# Patient Record
Sex: FEMALE | Race: WHITE | HISPANIC OR LATINO | Employment: FULL TIME | URBAN - NONMETROPOLITAN AREA
[De-identification: names, ages, dates, MRNs, and addresses within clinical notes are randomized per-mention and may not be internally consistent; named-entity substitution may affect disease eponyms.]

---

## 2023-09-10 ENCOUNTER — OFFICE VISIT (OUTPATIENT)
Dept: URGENT CARE | Facility: CLINIC | Age: 33
End: 2023-09-10
Payer: COMMERCIAL

## 2023-09-10 VITALS
HEART RATE: 56 BPM | TEMPERATURE: 98.6 F | DIASTOLIC BLOOD PRESSURE: 68 MMHG | HEIGHT: 68 IN | RESPIRATION RATE: 18 BRPM | BODY MASS INDEX: 27.74 KG/M2 | WEIGHT: 183 LBS | SYSTOLIC BLOOD PRESSURE: 106 MMHG | OXYGEN SATURATION: 100 %

## 2023-09-10 DIAGNOSIS — R10.13 EPIGASTRIC ABDOMINAL PAIN: ICD-10-CM

## 2023-09-10 LAB
APPEARANCE UR: CLEAR
BILIRUB UR STRIP-MCNC: NEGATIVE MG/DL
COLOR UR AUTO: YELLOW
GLUCOSE UR STRIP.AUTO-MCNC: NEGATIVE MG/DL
KETONES UR STRIP.AUTO-MCNC: NEGATIVE MG/DL
LEUKOCYTE ESTERASE UR QL STRIP.AUTO: NORMAL
NITRITE UR QL STRIP.AUTO: NEGATIVE
PH UR STRIP.AUTO: 5.5 [PH] (ref 5–8)
POCT INT CON NEG: NEGATIVE
POCT INT CON POS: POSITIVE
POCT URINE PREGNANCY TEST: NEGATIVE
PROT UR QL STRIP: NEGATIVE MG/DL
RBC UR QL AUTO: NEGATIVE
SP GR UR STRIP.AUTO: 1.02
UROBILINOGEN UR STRIP-MCNC: 0.2 MG/DL

## 2023-09-10 PROCEDURE — 81025 URINE PREGNANCY TEST: CPT

## 2023-09-10 PROCEDURE — 81002 URINALYSIS NONAUTO W/O SCOPE: CPT

## 2023-09-10 PROCEDURE — 3078F DIAST BP <80 MM HG: CPT

## 2023-09-10 PROCEDURE — 99203 OFFICE O/P NEW LOW 30 MIN: CPT

## 2023-09-10 PROCEDURE — 3074F SYST BP LT 130 MM HG: CPT

## 2023-09-10 RX ORDER — OMEPRAZOLE 20 MG/1
20 CAPSULE, DELAYED RELEASE ORAL DAILY
Qty: 30 CAPSULE | Refills: 0 | Status: SHIPPED | OUTPATIENT
Start: 2023-09-10 | End: 2023-10-10

## 2023-09-10 ASSESSMENT — ENCOUNTER SYMPTOMS
ABDOMINAL PAIN: 1
DIARRHEA: 0
MYALGIAS: 0
NAUSEA: 1
VOMITING: 1
BLOOD IN STOOL: 0
HEARTBURN: 1
CONSTIPATION: 0
SHORTNESS OF BREATH: 0

## 2023-09-10 NOTE — PROGRESS NOTES
"Subjective:     CHIEF COMPLAINT  Chief Complaint   Patient presents with    Abdominal Pain     Abdominal pain in middle of stomach x 2 days N/V        HPI  Batool Lopez is a very pleasant 33 y.o. female who presents with epigastric abdominal pain with nausea/vomiting for the past 2 days.  She has been feeling like she is having terrible acid reflux that has not been responding well to Tums.  She feels that the acid is causing her to vomit.  She has been able to tolerate food and liquids.  She denies seeing any blood in her vomit.  She has been having normal bowel movements without blood in her stool.    REVIEW OF SYSTEMS  Review of Systems   Respiratory:  Negative for shortness of breath.    Cardiovascular:  Negative for chest pain.   Gastrointestinal:  Positive for abdominal pain (Epigastric), heartburn, nausea and vomiting. Negative for blood in stool, constipation, diarrhea and melena.   Musculoskeletal:  Negative for myalgias.       PAST MEDICAL HISTORY  There are no problems to display for this patient.      SURGICAL HISTORY  patient denies any surgical history    ALLERGIES  No Known Allergies    CURRENT MEDICATIONS  Home Medications       Reviewed by HILDA Patiño-CHarry (Physician Assistant) on 09/10/23 at 1250  Med List Status: <None>     Medication Last Dose Status        Patient Heber Taking any Medications                           SOCIAL HISTORY  Social History     Tobacco Use    Smoking status: Never    Smokeless tobacco: Never   Substance and Sexual Activity    Alcohol use: Never    Drug use: Never    Sexual activity: Not on file       FAMILY HISTORY  History reviewed. No pertinent family history.       Objective:     VITAL SIGNS: /68   Pulse (!) 56   Temp 37 °C (98.6 °F) (Temporal)   Resp 18   Ht 1.727 m (5' 8\")   Wt 83 kg (183 lb)   SpO2 100%   BMI 27.83 kg/m²     PHYSICAL EXAM  Physical Exam  Vitals reviewed. Exam conducted with a chaperone present.   Constitutional:       General: " She is not in acute distress.     Appearance: Normal appearance. She is not ill-appearing or toxic-appearing.   HENT:      Head: Normocephalic and atraumatic.      Nose: Nose normal.      Mouth/Throat:      Mouth: Mucous membranes are moist.   Eyes:      Extraocular Movements: Extraocular movements intact.      Conjunctiva/sclera: Conjunctivae normal.      Pupils: Pupils are equal, round, and reactive to light.   Cardiovascular:      Rate and Rhythm: Normal rate and regular rhythm.      Heart sounds: Normal heart sounds.   Pulmonary:      Effort: Pulmonary effort is normal. No respiratory distress.      Breath sounds: No stridor. No wheezing, rhonchi or rales.   Abdominal:      General: Abdomen is flat. Bowel sounds are normal. There is no distension.      Palpations: Abdomen is soft. There is no mass.      Tenderness: There is no abdominal tenderness. There is no guarding or rebound. Negative signs include Cordova's sign and McBurney's sign.      Hernia: No hernia is present.   Musculoskeletal:         General: Normal range of motion.      Cervical back: Normal range of motion.   Skin:     General: Skin is warm and dry.   Neurological:      Mental Status: She is alert and oriented to person, place, and time.   Psychiatric:         Mood and Affect: Mood normal.         Assessment/Plan:     1. Epigastric abdominal pain  - POCT Urinalysis  - POCT Pregnancy  - omeprazole (PRILOSEC) 20 MG delayed-release capsule; Take 1 Capsule by mouth every day for 30 days.  Dispense: 30 Capsule; Refill: 0  -Increase hydration  -Avoid high acid foods such as tomatoes, coffee, energy drinks  -Be seen at ER immediately if symptoms acutely worsen  -Return to clinic if symptoms fail to resolve    MDM/Comments:  Patient has stable vital signs and is non-toxic appearing. Discussed supportive care with hydration, rest, omeprazole/Tums as needed.  Discussed avoiding high acid foods.  Strict ER precautions discussed with patient if her symptoms  worsen in any way.  Peruvian speaking video  used to facilitate this visit.  Patient demonstrated understanding of treatment plan at this time and will RTC if symptoms worsen or fail to resolve.     Differential diagnosis, natural history, supportive care, and indications for immediate follow-up discussed. All questions answered. Patient agrees with the plan of care.    Follow-up as needed if symptoms worsen or fail to improve to PCP, Urgent care or Emergency Room.    I have personally reviewed prior external notes and test results pertinent to today's visit.  I have independently reviewed and interpreted all diagnostics ordered during this urgent care acute visit.   Discussed management options (risks,benefits, and alternatives to treatment). Pt expresses understanding and the treatment plan was agreed upon. Questions were encouraged and answered to pt's satisfaction.    Please note that this dictation was created using voice recognition software. I have made a reasonable attempt to correct obvious errors, but I expect that there are errors of grammar and possibly content that I did not discover before finalizing the note.

## 2023-09-10 NOTE — LETTER
September 10, 2023    To Whom It May Concern:         This is confirmation that Batool Lopez attended her scheduled appointment with Priyanka Germain P.A.-C. on 9/10/23. Please excuse her absence from work today.          If you have any questions please do not hesitate to call me at the phone number listed below.    Sincerely,          Priyanka Germain P.A.-C.  [phone]

## 2023-11-09 PROBLEM — S83.8X1A ACUTE LATERAL MENISCAL INJURY OF RIGHT KNEE: Status: ACTIVE | Noted: 2023-11-09

## 2023-11-09 PROBLEM — S83.511A NEW ACL TEAR, RIGHT, INITIAL ENCOUNTER: Status: ACTIVE | Noted: 2023-11-09

## 2023-11-09 PROBLEM — S83.241A ACUTE MEDIAL MENISCUS TEAR OF RIGHT KNEE: Status: ACTIVE | Noted: 2023-11-09

## 2023-12-03 ENCOUNTER — OFFICE VISIT (OUTPATIENT)
Dept: URGENT CARE | Facility: CLINIC | Age: 33
End: 2023-12-03
Payer: COMMERCIAL

## 2023-12-03 VITALS
RESPIRATION RATE: 18 BRPM | HEART RATE: 72 BPM | HEIGHT: 68 IN | DIASTOLIC BLOOD PRESSURE: 80 MMHG | SYSTOLIC BLOOD PRESSURE: 118 MMHG | TEMPERATURE: 97.9 F | OXYGEN SATURATION: 99 % | BODY MASS INDEX: 27.28 KG/M2 | WEIGHT: 180 LBS

## 2023-12-03 DIAGNOSIS — K52.9 GASTROENTERITIS: ICD-10-CM

## 2023-12-03 PROCEDURE — 3079F DIAST BP 80-89 MM HG: CPT | Performed by: FAMILY MEDICINE

## 2023-12-03 PROCEDURE — 99213 OFFICE O/P EST LOW 20 MIN: CPT | Performed by: FAMILY MEDICINE

## 2023-12-03 PROCEDURE — 3074F SYST BP LT 130 MM HG: CPT | Performed by: FAMILY MEDICINE

## 2023-12-03 NOTE — PROGRESS NOTES
"  Subjective:      33 y.o. female presents to urgent care for GI upset that started in the middle of the night last night. She woke up around midnight with sudden onset of vomiting and diarrhea. No blood in vomit or stools. She has associated abdominal pain that is constant, it's located in her epi-gastric region, it's described as a twisting and sharp sensation, currently rated 8/10. She has tried Maalox with some relief in symptoms. No changes to urinary urgency, frequency, dysuria, or hematuria. No recent travel, antibiotic use, change in diet, or exposure to exotic animals. No prior abdominal surgery.     She denies any other questions or concerns at this time.    Current problem list, medication, and past medical/surgical history were reviewed in Epic.    ROS  See HPI     Objective:      /80   Pulse 72   Temp 36.6 °C (97.9 °F) (Temporal)   Resp 18   Ht 1.727 m (5' 8\")   Wt 81.6 kg (180 lb)   SpO2 99%   BMI 27.37 kg/m²     Physical Exam  Constitutional:       General: She is not in acute distress.     Appearance: She is not diaphoretic.   Cardiovascular:      Rate and Rhythm: Normal rate and regular rhythm.      Heart sounds: Normal heart sounds.   Pulmonary:      Effort: Pulmonary effort is normal. No respiratory distress.      Breath sounds: Normal breath sounds.   Abdominal:      Tenderness: There is abdominal tenderness (epi-gastric region). Negative signs include Cordova's sign, Rovsing's sign and McBurney's sign.   Neurological:      Mental Status: She is alert.   Psychiatric:         Mood and Affect: Affect normal.         Judgment: Judgment normal.       Assessment/Plan:     1. Gastroenteritis  Viral vs uncontrolled GERD. No red flag warning signs. She was encouraged to stay well hydrated. Use PPIs as needed.       Instructed to return to Urgent Care or nearest Emergency Department if symptoms fail to improve, for any change in condition, further concerns, or new concerning symptoms. Patient " states understanding of the plan of care and discharge instructions.    Ana Palacios M.D.

## 2023-12-03 NOTE — LETTER
December 3, 2023    To Whom It May Concern:         This is confirmation that Batool Lopez attended her scheduled appointment with Ana Palacios M.D. on 12/03/23. She may return to work tomorrow without any restrictions.          If you have any questions please do not hesitate to call me at the phone number listed below.    Sincerely,          Ana Palacios M.D.  [phone]

## 2024-05-01 ENCOUNTER — OFFICE VISIT (OUTPATIENT)
Dept: URGENT CARE | Facility: CLINIC | Age: 34
End: 2024-05-01
Payer: COMMERCIAL

## 2024-05-01 VITALS
SYSTOLIC BLOOD PRESSURE: 124 MMHG | TEMPERATURE: 97 F | RESPIRATION RATE: 16 BRPM | HEART RATE: 90 BPM | WEIGHT: 165 LBS | HEIGHT: 66 IN | OXYGEN SATURATION: 95 % | DIASTOLIC BLOOD PRESSURE: 82 MMHG | BODY MASS INDEX: 26.52 KG/M2

## 2024-05-01 DIAGNOSIS — R19.7 NAUSEA VOMITING AND DIARRHEA: ICD-10-CM

## 2024-05-01 DIAGNOSIS — R11.2 NAUSEA VOMITING AND DIARRHEA: ICD-10-CM

## 2024-05-01 PROCEDURE — 3079F DIAST BP 80-89 MM HG: CPT | Performed by: REGISTERED NURSE

## 2024-05-01 PROCEDURE — 3074F SYST BP LT 130 MM HG: CPT | Performed by: REGISTERED NURSE

## 2024-05-01 PROCEDURE — 99214 OFFICE O/P EST MOD 30 MIN: CPT | Performed by: REGISTERED NURSE

## 2024-05-01 RX ORDER — ONDANSETRON 4 MG/1
4 TABLET, ORALLY DISINTEGRATING ORAL ONCE
Status: COMPLETED | OUTPATIENT
Start: 2024-05-01 | End: 2024-05-01

## 2024-05-01 RX ORDER — ONDANSETRON 4 MG/1
4 TABLET, ORALLY DISINTEGRATING ORAL EVERY 8 HOURS PRN
Qty: 10 TABLET | Refills: 0 | Status: SHIPPED | OUTPATIENT
Start: 2024-05-01

## 2024-05-01 RX ORDER — OMEPRAZOLE 40 MG/1
40 CAPSULE, DELAYED RELEASE ORAL DAILY
Qty: 30 CAPSULE | Refills: 0 | Status: SHIPPED | OUTPATIENT
Start: 2024-05-01

## 2024-05-01 RX ADMIN — ONDANSETRON 4 MG: 4 TABLET, ORALLY DISINTEGRATING ORAL at 13:21

## 2024-05-01 ASSESSMENT — ENCOUNTER SYMPTOMS
SHORTNESS OF BREATH: 0
CHILLS: 0
NECK PAIN: 0
CONSTIPATION: 0
BLOOD IN STOOL: 0
DIZZINESS: 0
SORE THROAT: 0
FEVER: 0
ABDOMINAL PAIN: 0

## 2024-05-01 NOTE — PROGRESS NOTES
Subjective:   Batool Lopez is a 34 y.o. female who presents for Other (Been feeling vomiting , and having diarrhea x 2 day )      HPI  2 days of vomiting and diarrhea. Roughly 5 episodes of vomiting/day, and diarrhea roughly 2-4 episodes. No recent travel. Did try omeprazole and tums which provided some relief. Prior to symptoms starting she did eat something not typical for her but no one else was sick. Hx of gastritis and GERD, she stopped her daily omeprazole. No daily ibuprofen or etoh.  No urinary symptoms.    Denies pregnancy. Denies sudden onset or severe abdominal pain, bloody or coffee ground emesis, bloody or black tarry or wilder colored or mucousy stools, persistent unexplained vomiting, not passing wind/absolute constipation.      Review of Systems   Constitutional:  Negative for chills and fever.   HENT:  Negative for sore throat.    Respiratory:  Negative for shortness of breath.    Cardiovascular:  Negative for chest pain.   Gastrointestinal:  Negative for abdominal pain, blood in stool, constipation and melena.   Musculoskeletal:  Negative for neck pain.   Skin:  Negative for rash.   Neurological:  Negative for dizziness.       No Known Allergies    Patient Active Problem List    Diagnosis Date Noted    New ACL tear, right, initial encounter 11/09/2023    Acute medial meniscus tear of right knee 11/09/2023    Acute lateral meniscal injury of right knee 11/09/2023       Current Outpatient Medications Ordered in Epic   Medication Sig Dispense Refill    ondansetron (ZOFRAN ODT) 4 MG TABLET DISPERSIBLE Take 1 Tablet by mouth every 8 hours as needed for Nausea/Vomiting. 10 Tablet 0    omeprazole (PRILOSEC) 40 MG delayed-release capsule Take 1 Capsule by mouth every day. 30 Capsule 0    ondansetron (ZOFRAN) 4 MG Tab tablet Take 1 Tablet by mouth every four hours as needed for Nausea/Vomiting. 20 Tablet 0     Current Facility-Administered Medications Ordered in Epic   Medication Dose Route Frequency Provider  "Last Rate Last Admin    ondansetron (Zofran ODT) dispertab 4 mg  4 mg Oral Once TESSA GuzmanPSHAVONNE           Past Surgical History:   Procedure Laterality Date    PB KNEE SCOPE,AID ANT CRUCIATE REPAIR Right 2/28/2024    Procedure: RIGHT KNEE ARTHROSCOPY ANTERIOR CRUCIATE LIGAMENT RECONSTRUCTION WITH HAMSTRING AUTOGRAFT;  Surgeon: Rosibel Montes De Oca M.D.;  Location: [facility];  Service: Orthopedics    PB KNEE SCOPE,MED/LAT MENISECTOMY Right 2/28/2024    Procedure: RIGHT PARTIAL MEDIAL MENISCECTOMY;  Surgeon: Rosibel Montes De Oca M.D.;  Location: [facility];  Service: Orthopedics    PB KNEE SCOPE,MED/LAT MENISECTOMY Right 2/28/2024    Procedure: RIGHT PARTIAL LATERAL MENISCECTOMY, REPAIRS AS INDICATED;  Surgeon: Rosibel Montes De Oca M.D.;  Location: [facility];  Service: Orthopedics       Social History     Tobacco Use    Smoking status: Never    Smokeless tobacco: Never   Vaping Use    Vaping Use: Never used   Substance Use Topics    Alcohol use: Not Currently     Comment: socially    Drug use: Never       family history is not on file.     Problem list, medications, and allergies reviewed by myself today in Epic.     Objective:   /82   Pulse 90   Temp 36.1 °C (97 °F) (Temporal)   Resp 16   Ht 1.676 m (5' 6\")   Wt 74.8 kg (165 lb)   SpO2 95%   BMI 26.63 kg/m²     Physical Exam  Vitals and nursing note reviewed.   Constitutional:       Appearance: Normal appearance. She is not ill-appearing or toxic-appearing.   HENT:      Mouth/Throat:      Mouth: Mucous membranes are moist.   Eyes:      Pupils: Pupils are equal, round, and reactive to light.   Cardiovascular:      Rate and Rhythm: Normal rate and regular rhythm.   Pulmonary:      Effort: Pulmonary effort is normal. No respiratory distress.      Breath sounds: Normal breath sounds.   Abdominal:      General: Abdomen is flat.      Palpations: Abdomen is soft.      Tenderness: There is no " abdominal tenderness. There is no right CVA tenderness, left CVA tenderness, guarding or rebound.   Musculoskeletal:         General: Normal range of motion.      Cervical back: Normal range of motion.   Skin:     General: Skin is warm and dry.      Capillary Refill: Capillary refill takes less than 2 seconds.      Findings: No rash.   Neurological:      General: No focal deficit present.      Mental Status: She is alert and oriented to person, place, and time.      Cranial Nerves: No cranial nerve deficit.   Psychiatric:         Mood and Affect: Mood normal.         Assessment/Plan:     I personally reviewed prior external notes and test results pertinent to today's visit as well as additional imaging and testing completed in clinic today.     1. Nausea vomiting and diarrhea  ondansetron (Zofran ODT) dispertab 4 mg    ondansetron (ZOFRAN ODT) 4 MG TABLET DISPERSIBLE    omeprazole (PRILOSEC) 40 MG delayed-release capsule        Symptoms started roughly 2 days ago and they occurred after she ate something at work but is unsure if any coworkers were also sick.  Does report a history of gastritis with similar symptoms and she did stop her daily omeprazole.  She denies melena or hematochezia.  No coffee-ground emesis.  No recent travel.  No drinking from untreated water sources.  Vital signs are all reassuring.  Patient appears well and nontoxic.  No adventitious heart or lung sounds.  No abdominal, suprapubic or CVA tenderness.  Completely unremarkable exam.  Will give 4 mg Zofran in clinic.  Zofran for home.  Clear liquid diet x 24 to 48 hours and advance as tolerated to bland diet.  Will restart omeprazole.  Reviewed signs and symptoms that require immediate attention.    Shared decision-making was utilized with patient for treatment plan. Differential Diagnosis, natural history, and supportive care discussed.     Medication discussed included indication for use and the potential benefits and side effects. Education  was provided regarding the aforementioned assessments. All of the patient's questions were answered to their satisfaction at the time of discharge. Patient was encouraged to monitor symptoms closely. Those signs and symptoms which would warrant concern and mandate seeking a higher level of service through the emergency department discussed at length. Patient stated agreement and understanding of this plan of care.     Please note that this dictation was created using voice recognition software. I have made every reasonable attempt to correct obvious errors, but I expect that there are errors of grammar and possibly content that I did not discover before finalizing the note.    This note was electronically signed by DILCIA Guzman

## 2024-09-08 ENCOUNTER — OFFICE VISIT (OUTPATIENT)
Dept: URGENT CARE | Facility: CLINIC | Age: 34
End: 2024-09-08
Payer: COMMERCIAL

## 2024-09-08 VITALS
BODY MASS INDEX: 26.52 KG/M2 | SYSTOLIC BLOOD PRESSURE: 124 MMHG | HEART RATE: 80 BPM | OXYGEN SATURATION: 99 % | DIASTOLIC BLOOD PRESSURE: 80 MMHG | TEMPERATURE: 97.8 F | RESPIRATION RATE: 16 BRPM | WEIGHT: 165 LBS | HEIGHT: 66 IN

## 2024-09-08 DIAGNOSIS — T61.781A ALLERGIC REACTION TO SHELLFISH: ICD-10-CM

## 2024-09-08 PROCEDURE — 99213 OFFICE O/P EST LOW 20 MIN: CPT | Performed by: NURSE PRACTITIONER

## 2024-09-08 PROCEDURE — 3079F DIAST BP 80-89 MM HG: CPT | Performed by: NURSE PRACTITIONER

## 2024-09-08 PROCEDURE — 3074F SYST BP LT 130 MM HG: CPT | Performed by: NURSE PRACTITIONER

## 2024-09-08 RX ORDER — DEXAMETHASONE SODIUM PHOSPHATE 10 MG/ML
10 INJECTION INTRAMUSCULAR; INTRAVENOUS ONCE
Status: COMPLETED | OUTPATIENT
Start: 2024-09-08 | End: 2024-09-08

## 2024-09-08 RX ORDER — HYDROXYZINE PAMOATE 25 MG/1
CAPSULE ORAL
COMMUNITY
Start: 2024-06-26

## 2024-09-08 RX ORDER — CETIRIZINE HYDROCHLORIDE 10 MG/1
10 TABLET ORAL ONCE
Status: COMPLETED | OUTPATIENT
Start: 2024-09-08 | End: 2024-09-08

## 2024-09-08 RX ORDER — METHYLPREDNISOLONE 4 MG
TABLET, DOSE PACK ORAL
Qty: 21 TABLET | Refills: 0 | Status: SHIPPED | OUTPATIENT
Start: 2024-09-08

## 2024-09-08 RX ADMIN — CETIRIZINE HYDROCHLORIDE 10 MG: 10 TABLET ORAL at 13:55

## 2024-09-08 RX ADMIN — DEXAMETHASONE SODIUM PHOSPHATE 10 MG: 10 INJECTION INTRAMUSCULAR; INTRAVENOUS at 13:54

## 2024-09-08 ASSESSMENT — ENCOUNTER SYMPTOMS
WHEEZING: 0
SHORTNESS OF BREATH: 0

## 2024-09-08 NOTE — PROGRESS NOTES
Subjective:     Batool Lopez is a 34 y.o. female who presents for Allergic Reaction (Patient coming for food allergic reaction to shrimp, rash on lower back x 1day )      Presents for rash starting on her back after she ate shrimp last night. She does have a history of rash with eating shrimp, and will normally take 2 allergy pills beforehand. Has the sensation of lip swelling. Is swallowing ok. No shortness of breath. Is having improvement in the rash, as the rash was reportedly everywhere. Lip swelling has improved. Is still itchy.         Allergic Reaction  This is a recurrent problem. Associated symptoms include a rash. She has tried nothing for the symptoms.       No past medical history on file.    Past Surgical History:   Procedure Laterality Date    PB KNEE SCOPE,AID ANT CRUCIATE REPAIR Right 2/28/2024    Procedure: RIGHT KNEE ARTHROSCOPY ANTERIOR CRUCIATE LIGAMENT RECONSTRUCTION WITH HAMSTRING AUTOGRAFT;  Surgeon: Rosibel Montes De Oca M.D.;  Location: [facility];  Service: Orthopedics    PB KNEE SCOPE,MED/LAT MENISECTOMY Right 2/28/2024    Procedure: RIGHT PARTIAL MEDIAL MENISCECTOMY;  Surgeon: Rosibel Montes De Oca M.D.;  Location: [facility];  Service: Orthopedics    PB KNEE SCOPE,MED/LAT MENISECTOMY Right 2/28/2024    Procedure: RIGHT PARTIAL LATERAL MENISCECTOMY, REPAIRS AS INDICATED;  Surgeon: Rosibel Montes De Oca M.D.;  Location: [facility];  Service: Orthopedics       Social History     Socioeconomic History    Marital status: Unknown     Spouse name: Not on file    Number of children: Not on file    Years of education: Not on file    Highest education level: Not on file   Occupational History    Not on file   Tobacco Use    Smoking status: Never    Smokeless tobacco: Never   Vaping Use    Vaping status: Never Used   Substance and Sexual Activity    Alcohol use: Not Currently     Comment: socially    Drug use: Never    Sexual activity: Not on  "file   Other Topics Concern    Not on file   Social History Narrative    Not on file     Social Determinants of Health     Financial Resource Strain: Not on file   Food Insecurity: Not on file   Transportation Needs: Not on file   Physical Activity: Not on file   Stress: Not on file   Social Connections: Not on file   Intimate Partner Violence: Not on file   Housing Stability: Not on file        No family history on file.     No Known Allergies    Review of Systems   Respiratory:  Negative for shortness of breath, wheezing and stridor.    Skin:  Positive for itching and rash.   All other systems reviewed and are negative.       Objective:   /80   Pulse 80   Temp 36.6 °C (97.8 °F) (Temporal)   Resp 16   Ht 1.676 m (5' 6\")   Wt 74.8 kg (165 lb)   SpO2 99%   BMI 26.63 kg/m²     Physical Exam  Vitals reviewed.   HENT:      Mouth/Throat:      Lips: Pink.      Mouth: Mucous membranes are moist. No angioedema.      Pharynx: Oropharynx is clear. No pharyngeal swelling.      Comments: Cleaar speech.   Pulmonary:      Effort: Pulmonary effort is normal. No respiratory distress.      Breath sounds: Normal breath sounds. No stridor. No wheezing.   Skin:     General: Skin is warm and dry.      Findings: No rash.   Neurological:      Mental Status: She is alert.         Assessment/Plan:   1. Allergic reaction to shellfish  - cetirizine (ZyrTEC) tablet 10 mg  - dexamethasone (Decadron) injection (check route below) 10 mg  - methylPREDNISolone (MEDROL DOSEPAK) 4 MG Tablet Therapy Pack; Follow schedule on package instructions.  Dispense: 21 Tablet; Refill: 0    -Trigger Avoidance.   -Avoid scratching to cause any skin breakdown.  -OTC Zyrtec or anti-histamine (benadryl).    Follow up with PCP. Follow up emergently for increased facial or oral swelling, difficulty swallowing, or shortness of breath.     services used for the visit 368119. Patient has a know allergy to shrimp, generally resulting in a rash. " Clear speech, airway, and respirations. Provided contingent medrol for persistent symptoms. Encourage continued antihistamine use, as directed on packaging, until sympomts resolve.     Differential diagnosis, natural history, supportive care, and indications for immediate follow-up discussed.

## 2024-09-08 NOTE — PATIENT INSTRUCTIONS
-Trigger Avoidance.   -Avoid scratching to cause any skin breakdown.  -OTC Zyrtec or anti-histamine (benadryl).    Follow up with PCP. Follow up emergently for increased facial or oral swelling, difficulty swallowing, or shortness of breath.

## 2024-09-12 ASSESSMENT — ENCOUNTER SYMPTOMS: STRIDOR: 0

## 2024-09-14 ENCOUNTER — HOSPITAL ENCOUNTER (EMERGENCY)
Facility: MEDICAL CENTER | Age: 34
End: 2024-09-14
Attending: EMERGENCY MEDICINE
Payer: COMMERCIAL

## 2024-09-14 VITALS
BODY MASS INDEX: 29.11 KG/M2 | DIASTOLIC BLOOD PRESSURE: 56 MMHG | HEART RATE: 61 BPM | OXYGEN SATURATION: 100 % | TEMPERATURE: 98 F | WEIGHT: 180.34 LBS | RESPIRATION RATE: 18 BRPM | SYSTOLIC BLOOD PRESSURE: 105 MMHG

## 2024-09-14 DIAGNOSIS — B00.52 HERPES KERATITIS OF RIGHT EYE: ICD-10-CM

## 2024-09-14 DIAGNOSIS — H57.11 PAIN OF RIGHT EYE: ICD-10-CM

## 2024-09-14 PROCEDURE — A9270 NON-COVERED ITEM OR SERVICE: HCPCS | Performed by: EMERGENCY MEDICINE

## 2024-09-14 PROCEDURE — 99284 EMERGENCY DEPT VISIT MOD MDM: CPT

## 2024-09-14 PROCEDURE — 700102 HCHG RX REV CODE 250 W/ 637 OVERRIDE(OP): Performed by: EMERGENCY MEDICINE

## 2024-09-14 PROCEDURE — 700101 HCHG RX REV CODE 250: Performed by: EMERGENCY MEDICINE

## 2024-09-14 RX ORDER — ERYTHROMYCIN 5 MG/G
1 OINTMENT OPHTHALMIC ONCE
Status: COMPLETED | OUTPATIENT
Start: 2024-09-14 | End: 2024-09-14

## 2024-09-14 RX ORDER — PROPARACAINE HYDROCHLORIDE 5 MG/ML
1 SOLUTION/ DROPS OPHTHALMIC ONCE
Status: COMPLETED | OUTPATIENT
Start: 2024-09-14 | End: 2024-09-14

## 2024-09-14 RX ORDER — ERYTHROMYCIN 5 MG/G
1 OINTMENT OPHTHALMIC 4 TIMES DAILY
Qty: 3.5 G | Refills: 0 | Status: ACTIVE | OUTPATIENT
Start: 2024-09-14 | End: 2024-09-14

## 2024-09-14 RX ORDER — ERYTHROMYCIN 5 MG/G
1 OINTMENT OPHTHALMIC 4 TIMES DAILY
Qty: 3.5 G | Refills: 0 | Status: ACTIVE | OUTPATIENT
Start: 2024-09-14

## 2024-09-14 RX ORDER — VALACYCLOVIR HYDROCHLORIDE 500 MG/1
500 TABLET, FILM COATED ORAL 3 TIMES DAILY
Qty: 21 TABLET | Refills: 0 | Status: ACTIVE | OUTPATIENT
Start: 2024-09-14 | End: 2024-09-21

## 2024-09-14 RX ORDER — VALACYCLOVIR HYDROCHLORIDE 500 MG/1
500 TABLET, FILM COATED ORAL ONCE
Status: COMPLETED | OUTPATIENT
Start: 2024-09-14 | End: 2024-09-14

## 2024-09-14 RX ADMIN — PROPARACAINE HYDROCHLORIDE 1 DROP: 5 SOLUTION/ DROPS OPHTHALMIC at 15:00

## 2024-09-14 RX ADMIN — FLUORESCEIN SODIUM 1 MG: 1 STRIP OPHTHALMIC at 15:00

## 2024-09-14 RX ADMIN — ERYTHROMYCIN 1 APPLICATION: 5 OINTMENT OPHTHALMIC at 15:02

## 2024-09-14 RX ADMIN — VALACYCLOVIR HYDROCHLORIDE 500 MG: 500 TABLET, FILM COATED ORAL at 15:02

## 2024-09-14 NOTE — ED TRIAGE NOTES
"Chief Complaint   Patient presents with    Eye Pain     PT sent by urgent care d/t vision changes in the right eye. PT notes loss of vision in right eye since yesterday at 1600. PT states that urgent care told her \"it's an ulcer.\"      /72   Pulse 78   Temp 36.7 °C (98 °F) (Temporal)   Resp 18   Wt 81.8 kg (180 lb 5.4 oz)   LMP  (LMP Unknown)   SpO2 95%   BMI 29.11 kg/m²     "

## 2024-09-14 NOTE — ED PROVIDER NOTES
"  ER Provider Note    Scribed for Omar Calderon M.D. by Prince Saucedo. 9/14/2024   2:35 PM    Primary Care Provider: Pcp Pt States None    CHIEF COMPLAINT  Chief Complaint   Patient presents with    Eye Pain     PT sent by urgent care d/t vision changes in the right eye. PT notes loss of vision in right eye since yesterday at 1600. PT states that urgent care told her \"it's an ulcer.\"      EXTERNAL RECORDS REVIEWED  Outpatient Notes Reviewed urgent care note from today. She was advised to follow up with ophthalmology or present to the [facility] ED.      HPI/ROS  LIMITATION TO HISTORY   Select: : None  OUTSIDE HISTORIAN(S):  Significant other Patient's  is present at bedside for support.    Batool Lopez is a 34 y.o. female who presents to the ED for evaluation of right eye vision changes onset 4 PM yesterday. Patient reports that she initially evaluated to urgent care today where she was advised to present to the [facility] ED for further evaluation.  Patient reports associated redness and irritation to her right eye. She denies any other symptoms or complaints at this time.     PAST MEDICAL HISTORY  Patient denies any pertinent medical history.     SURGICAL HISTORY  Past Surgical History:   Procedure Laterality Date    PB KNEE SCOPE,AID ANT CRUCIATE REPAIR Right 2/28/2024    Procedure: RIGHT KNEE ARTHROSCOPY ANTERIOR CRUCIATE LIGAMENT RECONSTRUCTION WITH HAMSTRING AUTOGRAFT;  Surgeon: Rosibel Montes De Oca M.D.;  Location: [facility];  Service: Orthopedics    PB KNEE SCOPE,MED/LAT MENISECTOMY Right 2/28/2024    Procedure: RIGHT PARTIAL MEDIAL MENISCECTOMY;  Surgeon: Rosibel Montes De Oca M.D.;  Location: [facility];  Service: Orthopedics    PB KNEE SCOPE,MED/LAT MENISECTOMY Right 2/28/2024    Procedure: RIGHT PARTIAL LATERAL MENISCECTOMY, REPAIRS AS INDICATED;  Surgeon: Rosibel Montes De Oca M.D.;  Location: [facility];  Service: Orthopedics       [facility]" HISTORY  None pertinent to today's encounter.     SOCIAL HISTORY   reports that she has never smoked. She has never used smokeless tobacco. She reports that she does not currently use alcohol. She reports that she does not use drugs.    CURRENT MEDICATIONS  Discharge Medication List as of 9/14/2024  2:57 PM        CONTINUE these medications which have NOT CHANGED    Details   hydrOXYzine pamoate (VISTARIL) 25 MG Cap TAKE 1 TO 2 CAPSULES BY MOUTH AT BEDTIME AS NEEDED, Historical Med      sertraline (ZOLOFT) 50 MG Tab Take 50 mg by mouth every morning., Historical Med      methylPREDNISolone (MEDROL DOSEPAK) 4 MG Tablet Therapy Pack Follow schedule on package instructions., Disp-21 Tablet, R-0, Normal      ondansetron (ZOFRAN ODT) 4 MG TABLET DISPERSIBLE Take 1 Tablet by mouth every 8 hours as needed for Nausea/Vomiting., Disp-10 Tablet, R-0, Normal      omeprazole (PRILOSEC) 40 MG delayed-release capsule Take 1 Capsule by mouth every day., Disp-30 Capsule, R-0, Normal      ondansetron (ZOFRAN) 4 MG Tab tablet Take 1 Tablet by mouth every four hours as needed for Nausea/Vomiting., Disp-20 Tablet, R-0, Normal             ALLERGIES  No Known Allergies     PHYSICAL EXAM  /72   Pulse 78   Temp 36.7 °C (98 °F) (Temporal)   Resp 18   Wt 81.8 kg (180 lb 5.4 oz)   LMP  (LMP Unknown)   SpO2 95%   BMI 29.11 kg/m²    Nursing note and vitals reviewed.  Constitutional: Well-developed and well-nourished. No distress.   HENT: Head is normocephalic and atraumatic. Oropharynx is clear and moist without exudate or erythema.   Eyes: Small corneal ulcer just to the 9 o'clock position of the central point of the cornea with a somewhat dendritic appearance. Pupils are equal, round, and reactive to light. Cardiovascular: Normal rate and regular rhythm. No murmur heard. Normal radial pulses.  Pulmonary/Chest: Breath sounds normal. No wheezes or rales.   Abdominal: Soft and non-tender. No distention    Musculoskeletal:  Extremities exhibit normal range of motion without edema or tenderness.   Neurological: Awake, alert and oriented to person, place, and time. No focal deficits noted.  Skin: Skin is warm and dry. No rash.   Psychiatric: Normal mood and affect. Appropriate for clinical situation    DIAGNOSTIC STUDIES    Labs:   Results for orders placed or performed during the hospital encounter of 02/28/24   HCG QUALITATIVE UR   Result Value Ref Range    Pregnancy Test, Urine Negative     Pregnancy Test, Urine            INITIAL ASSESSMENT AND PLAN    2:35 PM - Patient was evaluated at bedside. Patient arrives today with vision changes to her right eye, as recommend by Urgent Care. I performed a slit lamp examination at bedside which revealed a small corneal ulcer. I discussed my plan to consult the ophthalmologist for her case, and she agrees to this plan of care. Patient verbalizes understanding and support with my plan of care.      2:53 PM - I discussed the patient's case and the above findings with Dr. Aguiar (Opthalmology) who would like the patient to have oral Valtrax and topical antibacterial, and will see in clinic.      2:57 PM - I reevaluated the patient at bedside. The patient informs me they do not have any new or changing symptoms or complaints. I informed the patient of my discussion with Dr. Aguiar as noted above and I discussed plan for discharge and follow up as outlined below. The patient is stable for discharge at this time and will return for any new or worsening symptoms. Patient verbalizes understanding and support with my plan for discharge.         DISPOSITION AND DISCUSSIONS    I have discussed management of the patient with the following physicians and LAVERNE's:  Dr. Aguiar (Opthalmology)     Discussion of management with other [facility] or appropriate source(s): None     Escalation of care considered, and ultimately not performed: .    Barriers to care at this time, including but not limited to: Patient  does not have established PCP.     Decision tools and prescription drugs considered including, but not limited to:  Valtrex and erythromycin .     The patient will return for new or worsening symptoms and is stable at the time of discharge.    DISPOSITION:  Patient will be discharged home in stable condition.    FOLLOW UP:  [facility], Emergency Dept  [address]  [place] [id number] [phone]    If symptoms worsen    Kushal Lowry M.D.  [address] [address] [phone]    Schedule an appointment as soon as possible for a visit         OUTPATIENT MEDICATIONS:  Discharge Medication List as of 9/14/2024  2:57 PM        START taking these medications    Details   erythromycin 5 MG/GM Ointment Apply 1 Application to right eye 4 times a day. Apply small amount twice a day, Disp-3.5 g, R-0, Normal      valacyclovir (VALTREX) 500 MG Tab Take 1 Tablet by mouth 3 times a day for 7 days., Disp-21 Tablet, R-0, Normal              FINAL DIAGNOSIS  1. Herpes keratitis of right eye    2. Pain of right eye         Prince LOONEY (Scribe), am scribing for, and in the presence of, Omar Calderon M.D..    Electronically signed by: Prince Saucedo (Boibbhavya), 9/14/2024    Omar LOONEY M.D. personally performed the services described in this documentation, as scribed by Prince Saucedo in my presence, and it is both accurate and complete.      The note accurately reflects work and decisions made by me.  Omar Calderon M.D.  9/14/2024

## 2025-03-31 NOTE — LETTER
Faxed over office note, med list, and A1c to number provided. We have not received any faxes from [facility]. Spoke with pt and is aware   May 1, 2024         Patient: Batool Lopez   YOB: 1990   Date of Visit: 5/1/2024           To Whom it May Concern:    Batool Lopez was seen in my clinic on 5/1/2024. Please excuse any absence related to this starting 04/29/24, she may return on  05/05/24.    If you have any questions or concerns, please don't hesitate to call.        Sincerely,           DILCIA Guzman  Electronically Signed